# Patient Record
Sex: MALE | Race: AMERICAN INDIAN OR ALASKA NATIVE | NOT HISPANIC OR LATINO | URBAN - METROPOLITAN AREA
[De-identification: names, ages, dates, MRNs, and addresses within clinical notes are randomized per-mention and may not be internally consistent; named-entity substitution may affect disease eponyms.]

---
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
## 2020-08-21 NOTE — PATIENT PROFILE ADULT - NSPROGENARRIVEDFROM_GEN_A_NUR
Last refill #90 on 5/11/2020  Last office visit on 7/6/2020  No future appointments. L  Labs current until 10/6/2020.   Refilled per protocol emergency department

## 2024-03-29 NOTE — PROGRESS NOTE ADULT - ATTENDING COMMENTS
Pt doing well, afebrile, exam consistent with RLQ peritonitis     Perforated appendicitis  -Reg diet  -serial exams, cbc  -IV abx with transition to PO abx tomorrow - d/c planning    I spent 20min reviewing history, data, images and in discussion/coordination of care. Greater than 50% of my time was spent in face-to-face discussion with the patient regarding pain control and IV abx/PO regiment.     Triston Lazaro MD (Cell: [phone])  Acute and Critical Care Surgery    The Acute Care Surgery (B Team) Attending Group Practice:  Dr. Tristan Servin, Dr. Samia Crain, Dr. Tai Rangel, Dr. Xiao Regalado, Dr. Triston Lazaro    Urgent issues - spectra [phone] or [phone]  Nonurgent issues - [phone]  Patient appointments or after hours - [phone]
I saw and examined the patient and agree with the above note.    Perforated appendicitis  -Reg diet  -serial exams, cbc  -IV abx with transition to PO abx tomorrow - d/c planning    I spent 20min reviewing history, data, images and in discussion/coordination of care. Greater than 50% of my time was spent in face-to-face discussion with the patient regarding post discharge follow up and eventual lap appendectomy.    Triston Lazaro MD (Cell: [phone])  Acute and Critical Care Surgery    The Acute Care Surgery (B Team) Attending Group Practice:  Dr. Tristan Servin, Dr. Samia Crain, Dr. Tai Rangel, Dr. Xiao Regalado, Dr. Triston Lazaro    Urgent issues - spectra [phone] or [phone]  Nonurgent issues - [phone]  Patient appointments or after hours - [phone]
Seen and examined, chart and note reviewed, case discussed with B team    Acute appendicitis with abscess  a.  Patient  with RLQ pain tenderness/fullness  b.  Remain NPO  c.  Continue zosyn  d.  CT reviewed,  Consult IR for possible drainage  e.  DVT prophylaxis  f.  Discuss with patient and his mother re: present treeatment with abx+/- drainage and possible need for surgery for non-improvement/detreioration
show

## 2024-08-06 NOTE — ASU PATIENT PROFILE, ADULT - NPO AFTER
Patient called stating she needs a new urine test for STD's Gonorrhea and Chlamydia. Please notify patient when orders are uploaded.    23:00